# Patient Record
Sex: MALE | Race: AMERICAN INDIAN OR ALASKA NATIVE
[De-identification: names, ages, dates, MRNs, and addresses within clinical notes are randomized per-mention and may not be internally consistent; named-entity substitution may affect disease eponyms.]

---

## 2019-11-09 ENCOUNTER — HOSPITAL ENCOUNTER (EMERGENCY)
Dept: HOSPITAL 5 - ED | Age: 49
Discharge: HOME | End: 2019-11-09
Payer: SELF-PAY

## 2019-11-09 VITALS — DIASTOLIC BLOOD PRESSURE: 71 MMHG | SYSTOLIC BLOOD PRESSURE: 121 MMHG

## 2019-11-09 DIAGNOSIS — F17.200: ICD-10-CM

## 2019-11-09 DIAGNOSIS — Z98.890: ICD-10-CM

## 2019-11-09 DIAGNOSIS — Z88.0: ICD-10-CM

## 2019-11-09 DIAGNOSIS — K04.7: Primary | ICD-10-CM

## 2019-11-09 NOTE — EMERGENCY DEPARTMENT REPORT
ED ENT HPI





- General


Chief complaint: Dental/Oral


Stated complaint: SWOLLEN FACE


Time Seen by Provider: 11/09/19 09:12


Source: patient


Mode of arrival: Ambulatory


Limitations: No Limitations





- History of Present Illness


MD complaint: tooth pain


Severity: moderate


Worsens with: none


Context- Dental: history of dental caries, poor dental care


Associated Symptoms: toothache.  denies: fever, cough, gum swelling





- Related Data


                                    Allergies











Allergy/AdvReac Type Severity Reaction Status Date / Time


 


Penicillins Allergy  Unknown Verified 11/09/19 09:09














ED Dental HPI





- General


Chief complaint: Dental/Oral


Stated complaint: SWOLLEN FACE


Time Seen by Provider: 11/09/19 09:12


Source: patient


Mode of arrival: Ambulatory


Limitations: No Limitations





- Related Data


                                    Allergies











Allergy/AdvReac Type Severity Reaction Status Date / Time


 


Penicillins Allergy  Unknown Verified 11/09/19 09:09














ED Review of Systems


ROS: 


Stated complaint: SWOLLEN FACE


Other details as noted in HPI





Comment: All other systems reviewed and negative


Constitutional: denies: chills, fever


Respiratory: denies: cough, shortness of breath


Gastrointestinal: denies: abdominal pain, nausea, vomiting


Musculoskeletal: denies: back pain





ED Past Medical Hx





- Past Medical History


Previous Medical History?: No





- Surgical History


Additional Surgical History: right arm surgery





- Social History


Smoking Status: Current Every Day Smoker


Substance Use Type: Alcohol





ED Physical Exam





- General


Limitations: No Limitations


General appearance: alert, in no apparent distress





- Head


Head exam: Present: atraumatic, normocephalic, normal inspection





- Eye


Eye exam: Present: normal appearance





- ENT


ENT exam: Present: normal exam, normal orophraynx, mucous membranes moist, other

(dental caries, abscess)





- Neck


Neck exam: Present: normal inspection, full ROM.  Absent: tenderness, 

meningismus, lymphadenopathy, thyromegaly





- Respiratory


Respiratory exam: Present: normal lung sounds bilaterally





- Cardiovascular


Cardiovascular Exam: Present: regular rate, normal rhythm, normal heart sounds





- GI/Abdominal


GI/Abdominal exam: Present: soft.  Absent: distended, tenderness, guarding, 

rebound





- Extremities Exam


Extremities exam: Present: normal inspection, full ROM, normal capillary refill.

 Absent: tenderness, pedal edema, joint swelling, calf tenderness





- Back Exam


Back exam: Present: normal inspection, full ROM.  Absent: CVA tenderness (R), CV

A tenderness (L)





- Neurological Exam


Neurological exam: Present: alert, oriented X3, CN II-XII intact





- Psychiatric


Psychiatric exam: Present: normal mood





- Skin


Skin exam: Present: warm, intact, normal color





ED Course





                                   Vital Signs











  11/09/19





  09:10


 


Temperature 98.5 F


 


Pulse Rate 62


 


Respiratory 18





Rate 


 


Blood Pressure 121/71


 


O2 Sat by Pulse 99





Oximetry 











Critical care attestation.: 


If time is entered above; I have spent that time in minutes in the direct care 

of this critically ill patient, excluding procedure time.








ED Disposition


Clinical Impression: 


 Dental abscess





Disposition: DC-01 TO HOME OR SELFCARE


Is pt being admited?: No


Condition: Stable


Instructions:  Dental Abscess (ED)


Referrals: 


[facility] [Provider Group] - 3-5 Days